# Patient Record
Sex: FEMALE | Race: BLACK OR AFRICAN AMERICAN | NOT HISPANIC OR LATINO | Employment: STUDENT | URBAN - METROPOLITAN AREA
[De-identification: names, ages, dates, MRNs, and addresses within clinical notes are randomized per-mention and may not be internally consistent; named-entity substitution may affect disease eponyms.]

---
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
## 2023-04-08 ENCOUNTER — HOSPITAL ENCOUNTER (EMERGENCY)
Facility: CLINIC | Age: 18
Discharge: HOME | End: 2023-04-08
Payer: COMMERCIAL

## 2023-04-08 VITALS
RESPIRATION RATE: 18 BRPM | HEART RATE: 72 BPM | SYSTOLIC BLOOD PRESSURE: 109 MMHG | DIASTOLIC BLOOD PRESSURE: 68 MMHG | OXYGEN SATURATION: 98 %
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
## 2025-01-29 ENCOUNTER — APPOINTMENT (OUTPATIENT)
Dept: GENERAL RADIOLOGY | Facility: CLINIC | Age: 20
End: 2025-01-29
Attending: EMERGENCY MEDICINE
Payer: COMMERCIAL

## 2025-01-29 ENCOUNTER — APPOINTMENT (OUTPATIENT)
Dept: CT IMAGING | Facility: CLINIC | Age: 20
End: 2025-01-29
Attending: EMERGENCY MEDICINE
Payer: COMMERCIAL

## 2025-01-29 ENCOUNTER — HOSPITAL ENCOUNTER (EMERGENCY)
Facility: CLINIC | Age: 20
Discharge: HOME | End: 2025-01-30
Attending: EMERGENCY MEDICINE | Admitting: EMERGENCY MEDICINE
Payer: COMMERCIAL

## 2025-01-29 VITALS
RESPIRATION RATE: 16 BRPM | DIASTOLIC BLOOD PRESSURE: 68 MMHG | HEIGHT: 67 IN | BODY MASS INDEX: 18.83 KG/M2 | HEART RATE: 67 BPM | TEMPERATURE: 98.8 F | WEIGHT: 120 LBS | SYSTOLIC BLOOD PRESSURE: 90 MMHG | OXYGEN SATURATION: 100 %

## 2025-01-29 DIAGNOSIS — R51.9 NONINTRACTABLE HEADACHE, UNSPECIFIED CHRONICITY PATTERN, UNSPECIFIED HEADACHE TYPE: ICD-10-CM
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
## 2025-01-29 DIAGNOSIS — M79.662 PAIN OF LEFT LOWER LEG: ICD-10-CM

## 2025-01-29 PROCEDURE — 250N000013 HC RX MED GY IP 250 OP 250 PS 637: Performed by: EMERGENCY MEDICINE

## 2025-01-29 PROCEDURE — 73590 X-RAY EXAM OF LOWER LEG: CPT | Mod: LT

## 2025-01-29 PROCEDURE — 99284 EMERGENCY DEPT VISIT MOD MDM: CPT | Mod: 25

## 2025-01-29 PROCEDURE — 70450 CT HEAD/BRAIN W/O DYE: CPT

## 2025-01-29 RX ORDER — HYDROCODONE BITARTRATE AND ACETAMINOPHEN 5; 325 MG/1; MG/1
2 TABLET ORAL ONCE
Status: COMPLETED | OUTPATIENT
Start: 2025-01-29 | End: 2025-01-29

## 2025-01-29 RX ADMIN — HYDROCODONE BITARTRATE AND ACETAMINOPHEN 2 TABLET: 5; 325 TABLET ORAL at 21:37

## 2025-01-29 ASSESSMENT — ACTIVITIES OF DAILY LIVING (ADL)
ADLS_ACUITY_SCORE: 41

## 2025-01-29 NOTE — Clinical Note
Jessa was seen and treated in our emergency department on 1/29/2025.  She may return to school on 01/31/2025.      If you have any questions or concerns, please don't hesitate to call.      Edwin Castillo MD

## 2025-01-29 NOTE — Clinical Note
Jessa was seen and treated in our emergency department on 1/29/2025.  She may return to school on 01/30/2025.      If you have any questions or concerns, please don't hesitate to call.      Edwin Castillo MD

## 2025-01-29 NOTE — Clinical Note
Jessa was seen and treated in our emergency department on 1/29/2025.  She may return to school on 02/03/2025.      If you have any questions or concerns, please don't hesitate to call.      Edwin Castillo MD
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
